# Patient Record
Sex: FEMALE | Race: WHITE
[De-identification: names, ages, dates, MRNs, and addresses within clinical notes are randomized per-mention and may not be internally consistent; named-entity substitution may affect disease eponyms.]

---

## 2017-05-09 ENCOUNTER — HOSPITAL ENCOUNTER (EMERGENCY)
Dept: HOSPITAL 54 - ER | Age: 22
Discharge: HOME | End: 2017-05-09
Payer: COMMERCIAL

## 2017-05-09 VITALS — BODY MASS INDEX: 19.63 KG/M2 | HEIGHT: 60 IN | WEIGHT: 100 LBS

## 2017-05-09 VITALS — SYSTOLIC BLOOD PRESSURE: 137 MMHG | DIASTOLIC BLOOD PRESSURE: 76 MMHG

## 2017-05-09 DIAGNOSIS — J45.909: ICD-10-CM

## 2017-05-09 DIAGNOSIS — H66.91: Primary | ICD-10-CM

## 2017-05-09 DIAGNOSIS — M54.5: ICD-10-CM

## 2017-05-09 DIAGNOSIS — F17.200: ICD-10-CM

## 2017-05-09 PROCEDURE — Z7610: HCPCS

## 2017-05-09 PROCEDURE — A4606 OXYGEN PROBE USED W OXIMETER: HCPCS

## 2018-01-12 ENCOUNTER — HOSPITAL ENCOUNTER (EMERGENCY)
Dept: HOSPITAL 54 - ER | Age: 23
Discharge: HOME | End: 2018-01-12
Payer: COMMERCIAL

## 2018-01-12 VITALS — HEIGHT: 59 IN | WEIGHT: 100 LBS | BODY MASS INDEX: 20.16 KG/M2

## 2018-01-12 VITALS — SYSTOLIC BLOOD PRESSURE: 101 MMHG | DIASTOLIC BLOOD PRESSURE: 59 MMHG

## 2018-01-12 DIAGNOSIS — N83.202: ICD-10-CM

## 2018-01-12 DIAGNOSIS — F17.200: ICD-10-CM

## 2018-01-12 DIAGNOSIS — Z3A.01: ICD-10-CM

## 2018-01-12 DIAGNOSIS — O34.81: Primary | ICD-10-CM

## 2018-01-12 DIAGNOSIS — J45.909: ICD-10-CM

## 2018-01-12 DIAGNOSIS — O23.41: ICD-10-CM

## 2018-01-12 LAB
ALBUMIN SERPL BCP-MCNC: 4.5 G/DL (ref 3.4–5)
ALP SERPL-CCNC: 53 U/L (ref 46–116)
ALT SERPL W P-5'-P-CCNC: 21 U/L (ref 12–78)
APPEARANCE UR: CLEAR
APTT PPP: 28 SEC (ref 23–34)
AST SERPL W P-5'-P-CCNC: 13 U/L (ref 15–37)
BASOPHILS # BLD AUTO: 0.1 /CMM (ref 0–0.2)
BASOPHILS NFR BLD AUTO: 1.1 % (ref 0–2)
BILIRUB DIRECT SERPL-MCNC: 0.1 MG/DL (ref 0–0.2)
BILIRUB SERPL-MCNC: 0.4 MG/DL (ref 0.2–1)
BILIRUB UR QL STRIP: NEGATIVE
BUN SERPL-MCNC: 13 MG/DL (ref 7–18)
CALCIUM SERPL-MCNC: 9.5 MG/DL (ref 8.5–10.1)
CHLORIDE SERPL-SCNC: 104 MMOL/L (ref 98–107)
CO2 SERPL-SCNC: 23 MMOL/L (ref 21–32)
COLOR UR: YELLOW
CREAT SERPL-MCNC: 0.7 MG/DL (ref 0.6–1.3)
EOSINOPHIL # BLD AUTO: 0.1 /CMM (ref 0–0.7)
EOSINOPHIL NFR BLD AUTO: 0.8 % (ref 0–6)
GLUCOSE SERPL-MCNC: 89 MG/DL (ref 74–106)
GLUCOSE UR STRIP-MCNC: NEGATIVE MG/DL
HCT VFR BLD AUTO: 44 % (ref 33–45)
HGB BLD-MCNC: 14.9 G/DL (ref 11.5–14.8)
HGB UR QL STRIP: NEGATIVE ERY/UL
INR PPP: 0.95 (ref 0.87–1.13)
KETONES UR STRIP-MCNC: NEGATIVE MG/DL
LEUKOCYTE ESTERASE UR QL STRIP: (no result)
LYMPHOCYTES NFR BLD AUTO: 18.2 % (ref 20–44)
LYMPHOCYTES NFR BLD AUTO: 2.3 /CMM (ref 0.8–4.8)
MCH RBC QN AUTO: 33 PG (ref 26–33)
MCHC RBC AUTO-ENTMCNC: 34 G/DL (ref 31–36)
MCV RBC AUTO: 96 FL (ref 82–100)
MONOCYTES NFR BLD AUTO: 0.6 /CMM (ref 0.1–1.3)
MONOCYTES NFR BLD AUTO: 5.2 % (ref 2–12)
NEUTROPHILS # BLD AUTO: 9.3 /CMM (ref 1.8–8.9)
NEUTROPHILS NFR BLD AUTO: 74.7 % (ref 43–81)
NITRITE UR QL STRIP: NEGATIVE
PH UR STRIP: 6 [PH] (ref 5–8)
PLATELET # BLD AUTO: 290 /CMM (ref 150–450)
POTASSIUM SERPL-SCNC: 4 MMOL/L (ref 3.5–5.1)
PROT SERPL-MCNC: 8.4 G/DL (ref 6.4–8.2)
PROT UR QL STRIP: NEGATIVE MG/DL
PROTHROMBIN TIME: 9.9 SECS (ref 9.5–12.7)
RBC # BLD AUTO: 4.55 MIL/UL (ref 4–5.2)
RBC #/AREA URNS HPF: (no result) /HPF (ref 0–2)
RDW COEFFICIENT OF VARIATION: 13.3 (ref 11.5–15)
SODIUM SERPL-SCNC: 139 MMOL/L (ref 136–145)
UROBILINOGEN UR STRIP-MCNC: 1 EU/DL
WBC #/AREA URNS HPF: (no result) /HPF (ref 0–3)
WBC NRBC COR # BLD AUTO: 12.5 K/UL (ref 4.3–11)

## 2018-01-12 PROCEDURE — A4606 OXYGEN PROBE USED W OXIMETER: HCPCS

## 2018-01-12 PROCEDURE — Z7610: HCPCS

## 2018-01-12 NOTE — NUR
TO BED 4 AMBULATORY C/O C/O LLQ ABD PAIN SINCE YESTERDAY. +N/V. LAST BM 
YESTERDAY MORNING. PT REPORTS THAT SHE TOOK A PREGNANCY TEST AND STATED THAT ID 
MIGHT BE FALSE POSITIVE. PT AAOX4 NO ACUTE DISTRESS NOTED, RESP EVEN AND 
UNLABORED. URINE SAMPLE COLLECTED AND SENT TO LAB.

## 2018-01-12 NOTE — NUR
IV removed. Catheter intact and site benign. Pressure and 4x4 applied to site. 
No bleeding noted. Patient discharged to home in stable condition. Written and 
verbal after care instructions given. Patient verbalizes understanding of 
instruction. ambulatory with a steady gait noted. pt aaox4 no acute distress 
noted, resp even and unlabored. advice pt not to drive or operate any machinery 
due to pt was given narcotic medicine. pt verbalize understanding. pt s/o at 
bedside to take pt home.

## 2018-01-25 ENCOUNTER — HOSPITAL ENCOUNTER (EMERGENCY)
Dept: HOSPITAL 54 - ER | Age: 23
Discharge: HOME | End: 2018-01-25
Payer: COMMERCIAL

## 2018-01-25 VITALS — DIASTOLIC BLOOD PRESSURE: 65 MMHG | SYSTOLIC BLOOD PRESSURE: 111 MMHG

## 2018-01-25 VITALS — WEIGHT: 105 LBS | HEIGHT: 59 IN | BODY MASS INDEX: 21.17 KG/M2

## 2018-01-25 DIAGNOSIS — O99.331: ICD-10-CM

## 2018-01-25 DIAGNOSIS — O26.891: Primary | ICD-10-CM

## 2018-01-25 DIAGNOSIS — O99.89: ICD-10-CM

## 2018-01-25 DIAGNOSIS — R21: ICD-10-CM

## 2018-01-25 DIAGNOSIS — Z91.018: ICD-10-CM

## 2018-01-25 DIAGNOSIS — Z3A.10: ICD-10-CM

## 2018-01-25 DIAGNOSIS — O99.511: ICD-10-CM

## 2018-01-25 PROCEDURE — A4606 OXYGEN PROBE USED W OXIMETER: HCPCS

## 2018-01-25 PROCEDURE — Z7502: HCPCS

## 2018-01-25 PROCEDURE — Z7610: HCPCS

## 2018-02-04 ENCOUNTER — HOSPITAL ENCOUNTER (EMERGENCY)
Dept: HOSPITAL 54 - ER | Age: 23
Discharge: HOME | End: 2018-02-04
Payer: COMMERCIAL

## 2018-02-04 VITALS — DIASTOLIC BLOOD PRESSURE: 71 MMHG | SYSTOLIC BLOOD PRESSURE: 110 MMHG

## 2018-02-04 VITALS — WEIGHT: 115 LBS | BODY MASS INDEX: 23.18 KG/M2 | HEIGHT: 59 IN

## 2018-02-04 DIAGNOSIS — O99.511: ICD-10-CM

## 2018-02-04 DIAGNOSIS — Z3A.01: ICD-10-CM

## 2018-02-04 DIAGNOSIS — Z91.018: ICD-10-CM

## 2018-02-04 DIAGNOSIS — J45.909: ICD-10-CM

## 2018-02-04 DIAGNOSIS — O99.331: ICD-10-CM

## 2018-02-04 DIAGNOSIS — O21.0: Primary | ICD-10-CM

## 2018-02-04 PROCEDURE — 99284 EMERGENCY DEPT VISIT MOD MDM: CPT

## 2018-02-04 PROCEDURE — A4606 OXYGEN PROBE USED W OXIMETER: HCPCS

## 2018-02-04 PROCEDURE — 96374 THER/PROPH/DIAG INJ IV PUSH: CPT

## 2018-02-04 PROCEDURE — 96361 HYDRATE IV INFUSION ADD-ON: CPT

## 2018-02-04 PROCEDURE — Z7610: HCPCS

## 2018-02-04 NOTE — NUR
IV removed. Catheter intact and site benign. Pressure and 4x4 applied to site. 
No bleeding noted. Patient discharged to home in stable condition. Written and 
verbal after care instructions given. Patient verbalizes understanding of 
instruction. Patient is ambulatory with steady gait, accompanied by partner. 
vss. nad noted. no further complaints.

## 2018-02-04 NOTE — NUR
PT BB BF FROM HOME C/O "N/V X4 DAYS AND RASH THAT WENT AWAY ON THURSDAY." "I 
CAME HERE FOR RASH, NOTHING DONE. I AM PREGNANT." PATIENT IS AAOX4, NAD NOTED. 
VSS. SKIN WARM AND DRY. COMFORT MEASURES RENDERED.

## 2018-03-20 NOTE — NUR
PT BIBRA TO ER BED 17. PER REPORT, SYNCOPE WHILE AT WORK. ON C COLLAR PTA. PT 
IS AWAKE. PLACED ON MONITOR. STABLE VITALS PTA. PT STATES 5 MONTHS PREGNANT. 
AWAITING MD ANDRADE.

## 2018-11-12 ENCOUNTER — HOSPITAL ENCOUNTER (EMERGENCY)
Age: 23
Discharge: HOME | End: 2018-11-12

## 2018-11-12 ENCOUNTER — HOSPITAL ENCOUNTER (EMERGENCY)
Dept: HOSPITAL 91 - FTE | Age: 23
Discharge: HOME | End: 2018-11-12
Payer: COMMERCIAL
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
## 2018-11-12 DIAGNOSIS — S00.33XA: Primary | ICD-10-CM
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
## 2018-11-12 PROCEDURE — 99282 EMERGENCY DEPT VISIT SF MDM: CPT

## 2018-11-12 RX ADMIN — ACETAMINOPHEN 1 MG: 500 TABLET, FILM COATED ORAL at 03:39

## 2019-09-12 NOTE — NUR
"BIBS. C/O "+N/V. +DIZZY. TAKING PILLS FOR  X1 DAY" -SOB. AOX4. VSS 
AMBULATOR Y "HAD FEVER TODAY, TOOK 1GM TYLENOL"  PT AAOX4, -SOB, NAD NOTED, VSS 
PENDING MD ANDRADE

## 2022-07-06 NOTE — NUR
TO ER BED 19. BIBS C/O R FOOT PAIN S/P "MARBLE CUTBOARD FELL ON FOOT". BRUISING 
NOTED ON R BIG TOE. PT AAOX4. AMBULATORY WITH STEADY GAIT. CONNECTED TO 
MONITOR. VSS. AWAITING MD ANDRADE

## 2022-11-24 NOTE — NUR
TO ER BED 1. BIBMOTHER FOR POSSIBLE ALLERGIC REACTION - "THROAT FEELS CLOSING" 
ALSO C/O R EARACHE. PT IS ALERT AND ORIENTED. RR EVEN AND NONLABORED. CONNECTED 
TO POX AND HEART MONITOR. VSS. AWAITING MD ANDRADE
